# Patient Record
Sex: MALE | Race: BLACK OR AFRICAN AMERICAN | NOT HISPANIC OR LATINO | Employment: OTHER | URBAN - METROPOLITAN AREA
[De-identification: names, ages, dates, MRNs, and addresses within clinical notes are randomized per-mention and may not be internally consistent; named-entity substitution may affect disease eponyms.]

---

## 2021-01-01 ENCOUNTER — HOSPITAL ENCOUNTER (EMERGENCY)
Facility: HOSPITAL | Age: 66
Discharge: HOME | End: 2021-01-01
Attending: INTERNAL MEDICINE
Payer: MEDICARE

## 2021-01-01 VITALS
BODY MASS INDEX: 37.89 KG/M2 | DIASTOLIC BLOOD PRESSURE: 99 MMHG | HEART RATE: 103 BPM | RESPIRATION RATE: 18 BRPM | WEIGHT: 250 LBS | OXYGEN SATURATION: 97 % | SYSTOLIC BLOOD PRESSURE: 178 MMHG | TEMPERATURE: 100 F | HEIGHT: 68 IN

## 2021-01-01 DIAGNOSIS — U07.1 COVID-19: Primary | ICD-10-CM

## 2021-01-01 DIAGNOSIS — I50.22 CHRONIC SYSTOLIC CONGESTIVE HEART FAILURE: ICD-10-CM

## 2021-01-01 DIAGNOSIS — I10 ESSENTIAL HYPERTENSION: ICD-10-CM

## 2021-01-01 DIAGNOSIS — I25.10 ATHEROSCLEROSIS OF CORONARY ARTERY, ANGINA PRESENCE UNSPECIFIED, UNSPECIFIED VESSEL OR LESION TYPE, UNSPECIFIED WHETHER NATIVE OR TRANSPLANTED HEART: ICD-10-CM

## 2021-01-01 PROBLEM — I63.9 CEREBROVASCULAR ACCIDENT: Status: ACTIVE | Noted: 2018-01-13

## 2021-01-01 PROBLEM — E78.5 HYPERLIPIDEMIA: Status: ACTIVE | Noted: 2021-01-01

## 2021-01-01 LAB
CTP QC/QA: YES
SARS-COV-2 RDRP RESP QL NAA+PROBE: POSITIVE

## 2021-01-01 PROCEDURE — 99284 EMERGENCY DEPT VISIT MOD MDM: CPT | Mod: 25,ER

## 2021-01-01 PROCEDURE — 25000003 PHARM REV CODE 250: Mod: ER | Performed by: INTERNAL MEDICINE

## 2021-01-01 PROCEDURE — U0002 COVID-19 LAB TEST NON-CDC: HCPCS | Mod: ER | Performed by: INTERNAL MEDICINE

## 2021-01-01 RX ORDER — PRAVASTATIN SODIUM 40 MG/1
TABLET ORAL
COMMUNITY

## 2021-01-01 RX ORDER — IBUPROFEN 400 MG/1
800 TABLET ORAL
Status: COMPLETED | OUTPATIENT
Start: 2021-01-01 | End: 2021-01-01

## 2021-01-01 RX ORDER — SPIRONOLACTONE 25 MG/1
TABLET ORAL
COMMUNITY

## 2021-01-01 RX ORDER — HYDRALAZINE HYDROCHLORIDE 10 MG/1
TABLET, FILM COATED ORAL
COMMUNITY

## 2021-01-01 RX ORDER — CLONIDINE HYDROCHLORIDE 0.1 MG/1
TABLET ORAL
COMMUNITY

## 2021-01-01 RX ORDER — IBUPROFEN 800 MG/1
800 TABLET ORAL EVERY 8 HOURS PRN
Qty: 30 TABLET | Refills: 0 | Status: SHIPPED | OUTPATIENT
Start: 2021-01-01 | End: 2022-05-24

## 2021-01-01 RX ORDER — AMLODIPINE BESYLATE 10 MG/1
TABLET ORAL
COMMUNITY

## 2021-01-01 RX ORDER — LISINOPRIL 40 MG/1
TABLET ORAL
COMMUNITY

## 2021-01-01 RX ADMIN — IBUPROFEN 800 MG: 400 TABLET, FILM COATED ORAL at 06:01

## 2021-01-02 ENCOUNTER — TELEPHONE (OUTPATIENT)
Dept: ADMINISTRATIVE | Facility: CLINIC | Age: 66
End: 2021-01-02

## 2021-01-07 ENCOUNTER — HOSPITAL ENCOUNTER (EMERGENCY)
Facility: HOSPITAL | Age: 66
Discharge: HOME | End: 2021-01-07
Attending: EMERGENCY MEDICINE
Payer: MEDICARE

## 2021-01-07 ENCOUNTER — TELEPHONE (OUTPATIENT)
Dept: ADMINISTRATIVE | Facility: CLINIC | Age: 66
End: 2021-01-07

## 2021-01-07 VITALS
RESPIRATION RATE: 20 BRPM | DIASTOLIC BLOOD PRESSURE: 89 MMHG | WEIGHT: 230 LBS | HEART RATE: 89 BPM | BODY MASS INDEX: 34.86 KG/M2 | SYSTOLIC BLOOD PRESSURE: 141 MMHG | OXYGEN SATURATION: 98 % | HEIGHT: 68 IN | TEMPERATURE: 99 F

## 2021-01-07 DIAGNOSIS — U07.1 COVID-19 VIRUS INFECTION: Primary | ICD-10-CM

## 2021-01-07 PROCEDURE — 99284 EMERGENCY DEPT VISIT MOD MDM: CPT | Mod: ER

## 2021-01-07 RX ORDER — PROMETHAZINE HYDROCHLORIDE AND DEXTROMETHORPHAN HYDROBROMIDE 6.25; 15 MG/5ML; MG/5ML
5 SYRUP ORAL NIGHTLY PRN
Qty: 118 ML | Refills: 0 | Status: SHIPPED | OUTPATIENT
Start: 2021-01-07 | End: 2021-01-17

## 2021-01-07 RX ORDER — ALBUTEROL SULFATE 90 UG/1
2 AEROSOL, METERED RESPIRATORY (INHALATION) EVERY 6 HOURS PRN
Qty: 18 G | Refills: 0 | Status: SHIPPED | OUTPATIENT
Start: 2021-01-07 | End: 2022-05-24

## 2021-01-07 RX ORDER — PREDNISONE 20 MG/1
40 TABLET ORAL DAILY
Qty: 10 TABLET | Refills: 0 | Status: SHIPPED | OUTPATIENT
Start: 2021-01-07 | End: 2021-01-12

## 2021-01-07 RX ORDER — BENZONATATE 100 MG/1
100 CAPSULE ORAL 3 TIMES DAILY PRN
Qty: 20 CAPSULE | Refills: 0 | Status: SHIPPED | OUTPATIENT
Start: 2021-01-07 | End: 2021-01-17

## 2022-05-24 ENCOUNTER — HOSPITAL ENCOUNTER (OUTPATIENT)
Facility: HOSPITAL | Age: 67
Discharge: HOME | End: 2022-05-25
Attending: EMERGENCY MEDICINE | Admitting: INTERNAL MEDICINE
Payer: MEDICARE

## 2022-05-24 DIAGNOSIS — R42 DIZZINESS: ICD-10-CM

## 2022-05-24 DIAGNOSIS — I63.9 CEREBROVASCULAR ACCIDENT (CVA), UNSPECIFIED MECHANISM: Primary | ICD-10-CM

## 2022-05-24 DIAGNOSIS — I63.9 CVA (CEREBRAL VASCULAR ACCIDENT): ICD-10-CM

## 2022-05-24 PROBLEM — E66.9 OBESITY (BMI 30-39.9): Status: RESOLVED | Noted: 2022-05-24 | Resolved: 2022-05-24

## 2022-05-24 PROBLEM — G45.9 TIA (TRANSIENT ISCHEMIC ATTACK): Status: ACTIVE | Noted: 2022-05-24

## 2022-05-24 PROBLEM — E66.9 OBESITY (BMI 30.0-34.9): Status: ACTIVE | Noted: 2022-05-24

## 2022-05-24 PROBLEM — E66.01 MORBID OBESITY: Status: ACTIVE | Noted: 2022-05-24

## 2022-05-24 LAB
ALBUMIN SERPL-MCNC: 3.7 G/DL (ref 3.3–5.5)
ALP SERPL-CCNC: 84 U/L (ref 42–141)
BILIRUB SERPL-MCNC: 0.4 MG/DL (ref 0.2–1.6)
BUN SERPL-MCNC: 15 MG/DL (ref 7–22)
CALCIUM SERPL-MCNC: 9.7 MG/DL (ref 8–10.3)
CHLORIDE SERPL-SCNC: 110 MMOL/L (ref 98–108)
CREAT SERPL-MCNC: 1.2 MG/DL (ref 0.6–1.2)
CTP QC/QA: YES
GLUCOSE SERPL-MCNC: 132 MG/DL (ref 73–118)
POC ALT (SGPT): 48 U/L (ref 10–47)
POC AST (SGOT): 39 U/L (ref 11–38)
POC TCO2: 31 MMOL/L (ref 18–33)
POCT GLUCOSE: 137 MG/DL (ref 70–110)
POTASSIUM BLD-SCNC: 5.4 MMOL/L (ref 3.6–5.1)
PROTEIN, POC: 8 G/DL (ref 6.4–8.1)
SARS-COV-2 RDRP RESP QL NAA+PROBE: NEGATIVE
SODIUM BLD-SCNC: 144 MMOL/L (ref 128–145)

## 2022-05-24 PROCEDURE — 36415 COLL VENOUS BLD VENIPUNCTURE: CPT | Performed by: NURSE PRACTITIONER

## 2022-05-24 PROCEDURE — 25000003 PHARM REV CODE 250: Mod: ER | Performed by: EMERGENCY MEDICINE

## 2022-05-24 PROCEDURE — 83036 HEMOGLOBIN GLYCOSYLATED A1C: CPT | Performed by: NURSE PRACTITIONER

## 2022-05-24 PROCEDURE — 21400001 HC TELEMETRY ROOM

## 2022-05-24 PROCEDURE — G0378 HOSPITAL OBSERVATION PER HR: HCPCS

## 2022-05-24 PROCEDURE — 93010 EKG 12-LEAD: ICD-10-PCS | Mod: ,,, | Performed by: INTERNAL MEDICINE

## 2022-05-24 PROCEDURE — 99285 EMERGENCY DEPT VISIT HI MDM: CPT | Mod: 25,ER

## 2022-05-24 PROCEDURE — 82962 GLUCOSE BLOOD TEST: CPT | Mod: ER

## 2022-05-24 PROCEDURE — 80053 COMPREHEN METABOLIC PANEL: CPT | Mod: ER

## 2022-05-24 PROCEDURE — 93010 ELECTROCARDIOGRAM REPORT: CPT | Mod: ,,, | Performed by: INTERNAL MEDICINE

## 2022-05-24 PROCEDURE — 93005 ELECTROCARDIOGRAM TRACING: CPT | Mod: ER

## 2022-05-24 PROCEDURE — U0002 COVID-19 LAB TEST NON-CDC: HCPCS | Mod: ER | Performed by: EMERGENCY MEDICINE

## 2022-05-24 PROCEDURE — 85025 COMPLETE CBC W/AUTO DIFF WBC: CPT | Mod: ER

## 2022-05-24 PROCEDURE — 84443 ASSAY THYROID STIM HORMONE: CPT | Performed by: NURSE PRACTITIONER

## 2022-05-24 PROCEDURE — 80061 LIPID PANEL: CPT | Performed by: NURSE PRACTITIONER

## 2022-05-24 RX ORDER — CLONIDINE HYDROCHLORIDE 0.1 MG/1
0.1 TABLET ORAL 2 TIMES DAILY
Status: DISCONTINUED | OUTPATIENT
Start: 2022-05-25 | End: 2022-05-25 | Stop reason: HOSPADM

## 2022-05-24 RX ORDER — CLOPIDOGREL BISULFATE 75 MG/1
75 TABLET ORAL DAILY
Status: DISCONTINUED | OUTPATIENT
Start: 2022-05-25 | End: 2022-05-25 | Stop reason: HOSPADM

## 2022-05-24 RX ORDER — HYDRALAZINE HYDROCHLORIDE 10 MG/1
10 TABLET, FILM COATED ORAL EVERY 8 HOURS
Status: DISCONTINUED | OUTPATIENT
Start: 2022-05-25 | End: 2022-05-25 | Stop reason: HOSPADM

## 2022-05-24 RX ORDER — SODIUM CHLORIDE 0.9 % (FLUSH) 0.9 %
10 SYRINGE (ML) INJECTION
Status: DISCONTINUED | OUTPATIENT
Start: 2022-05-25 | End: 2022-05-25 | Stop reason: HOSPADM

## 2022-05-24 RX ORDER — SPIRONOLACTONE 25 MG/1
25 TABLET ORAL DAILY
Status: DISCONTINUED | OUTPATIENT
Start: 2022-05-25 | End: 2022-05-25 | Stop reason: HOSPADM

## 2022-05-24 RX ORDER — ONDANSETRON 2 MG/ML
4 INJECTION INTRAMUSCULAR; INTRAVENOUS EVERY 6 HOURS PRN
Status: DISCONTINUED | OUTPATIENT
Start: 2022-05-25 | End: 2022-05-25 | Stop reason: HOSPADM

## 2022-05-24 RX ORDER — CLOPIDOGREL BISULFATE 75 MG/1
75 TABLET ORAL
Status: COMPLETED | OUTPATIENT
Start: 2022-05-24 | End: 2022-05-24

## 2022-05-24 RX ORDER — PRAVASTATIN SODIUM 40 MG/1
40 TABLET ORAL NIGHTLY
Status: DISCONTINUED | OUTPATIENT
Start: 2022-05-25 | End: 2022-05-25 | Stop reason: HOSPADM

## 2022-05-24 RX ORDER — NAPROXEN SODIUM 220 MG/1
324 TABLET, FILM COATED ORAL
Status: COMPLETED | OUTPATIENT
Start: 2022-05-24 | End: 2022-05-24

## 2022-05-24 RX ORDER — AMLODIPINE BESYLATE 5 MG/1
10 TABLET ORAL DAILY
Status: DISCONTINUED | OUTPATIENT
Start: 2022-05-25 | End: 2022-05-25 | Stop reason: HOSPADM

## 2022-05-24 RX ORDER — ASPIRIN 81 MG/1
81 TABLET ORAL DAILY
Status: DISCONTINUED | OUTPATIENT
Start: 2022-05-25 | End: 2022-05-25 | Stop reason: HOSPADM

## 2022-05-24 RX ORDER — ATORVASTATIN CALCIUM 40 MG/1
40 TABLET, FILM COATED ORAL DAILY
Status: DISCONTINUED | OUTPATIENT
Start: 2022-05-25 | End: 2022-05-24

## 2022-05-24 RX ORDER — LISINOPRIL 20 MG/1
40 TABLET ORAL DAILY
Status: DISCONTINUED | OUTPATIENT
Start: 2022-05-25 | End: 2022-05-25 | Stop reason: HOSPADM

## 2022-05-24 RX ORDER — ENOXAPARIN SODIUM 100 MG/ML
40 INJECTION SUBCUTANEOUS EVERY 24 HOURS
Status: DISCONTINUED | OUTPATIENT
Start: 2022-05-25 | End: 2022-05-25 | Stop reason: HOSPADM

## 2022-05-24 RX ORDER — LABETALOL HYDROCHLORIDE 5 MG/ML
10 INJECTION, SOLUTION INTRAVENOUS EVERY 6 HOURS PRN
Status: DISCONTINUED | OUTPATIENT
Start: 2022-05-25 | End: 2022-05-25 | Stop reason: HOSPADM

## 2022-05-24 RX ADMIN — CLOPIDOGREL 75 MG: 75 TABLET, FILM COATED ORAL at 06:05

## 2022-05-24 RX ADMIN — ASPIRIN 81 MG CHEWABLE TABLET 324 MG: 81 TABLET CHEWABLE at 06:05

## 2022-05-24 NOTE — ED PROVIDER NOTES
Encounter Date: 5/24/2022    SCRIBE #1 NOTE: I, Dorothea Dior, am scribing for, and in the presence of,  Ernie Fitzgerald MD. I have scribed the following portions of the note - Other sections scribed: HPI; ROS.       History     Chief Complaint   Patient presents with    Dizziness    Headache     Co dizziness and ha; onset this morning; denies cp or shob.     Huang Knight is a 67 y.o. male with Hx of HTN who presents to the ED for chief complaint of dizziness and headache onset 4 AM today. Patient reports that he experiences the dizziness when going from a lying to a sitting position. He states that if he stands up and ambulates, the dizziness resolves. Patient reports that his headache is better now compared to this morning. He attempted treatment with Aspirin and Tylenol with some relief of the headache. Patient endorses EtOH use occasionally but denies tobacco use. He denies appetite change, visual disturbance, coughing, congestion, fever, chills, nausea, vomiting, diarrhea, chest pain, or shortness of breath. No further complaints at this present time.     The history is provided by the patient. No  was used.     Review of patient's allergies indicates:   Allergen Reactions    Penicillins      Past Medical History:   Diagnosis Date    Hypertension      History reviewed. No pertinent surgical history.  History reviewed. No pertinent family history.  Social History     Tobacco Use    Smoking status: Never Smoker    Smokeless tobacco: Never Used   Substance Use Topics    Alcohol use: Yes     Comment: RARELY    Drug use: Never     Review of Systems   Constitutional: Negative.  Negative for appetite change, chills and fever.   HENT: Negative.  Negative for congestion.    Eyes: Negative.  Negative for visual disturbance.   Respiratory: Negative.  Negative for cough and shortness of breath.    Cardiovascular: Negative.  Negative for chest pain.   Gastrointestinal: Negative.  Negative  for diarrhea, nausea and vomiting.   Endocrine: Negative.    Genitourinary: Negative.    Musculoskeletal: Negative.    Skin: Negative.    Allergic/Immunologic: Negative.    Neurological: Positive for dizziness and headaches.   Hematological: Negative.    Psychiatric/Behavioral: Negative.        Physical Exam     Initial Vitals [05/24/22 1724]   BP Pulse Resp Temp SpO2   (!) 163/89 77 17 98 °F (36.7 °C) 96 %      MAP       --         Physical Exam    Nursing note and vitals reviewed.  Constitutional: He appears well-developed and well-nourished. He is not diaphoretic. No distress.   HENT:   Head: Normocephalic and atraumatic.   Nose: Nose normal.   Mouth/Throat: No oropharyngeal exudate.   Eyes: EOM are normal. Pupils are equal, round, and reactive to light.   Neck: Neck supple. No tracheal deviation present. No JVD present.   Normal range of motion.  Cardiovascular: Normal rate, regular rhythm and normal heart sounds.   No murmur heard.  Pulmonary/Chest: Breath sounds normal. No respiratory distress. He has no wheezes. He has no rhonchi. He has no rales.   Abdominal: Abdomen is soft. Bowel sounds are normal. He exhibits no distension. There is no abdominal tenderness. There is no rebound and no guarding.   Musculoskeletal:         General: No tenderness or edema. Normal range of motion.      Cervical back: Normal range of motion and neck supple.     Neurological: He is alert and oriented to person, place, and time. He has normal strength. No cranial nerve deficit.   Skin: Skin is warm and dry. Capillary refill takes less than 2 seconds. No rash noted. No erythema.         ED Course   Procedures  Labs Reviewed   POCT GLUCOSE - Abnormal; Notable for the following components:       Result Value    POCT Glucose 137 (*)     All other components within normal limits   POCT CMP - Abnormal; Notable for the following components:    ALT (SGPT), POC 48 (*)     AST (SGOT), POC 39 (*)     POC Chloride 110 (*)     POC Glucose 132  (*)     POC Potassium 5.4 (*)     All other components within normal limits   POCT CBC   POCT GLUCOSE, HAND-HELD DEVICE   SARS-COV-2 RDRP GENE   POCT CMP                Imaging Results           CT Head Without Contrast (Final result)  Result time 05/24/22 18:01:49    Final result by Bonnie Medellin MD (05/24/22 18:01:49)                 Impression:      Age-indeterminate left basal ganglia infarct.    This report was flagged in Epic as abnormal.      Electronically signed by: Bonnie Medellin  Date:    05/24/2022  Time:    18:01             Narrative:    EXAMINATION:  CT OF THE HEAD WITHOUT    CLINICAL HISTORY:  Headache, new or worsening (Age >= 50y);    TECHNIQUE:  5 mm unenhanced axial images were obtained from the skull base to the vertex.    COMPARISON:  None.    FINDINGS:  Mild cerebral atrophy and chronic small vessel ischemic changes are present.  There is bifrontal atrophy versus bilateral subdural hygromas.  There is no acute intracranial hemorrhage, territorial infarct or mass effect, or midline shift.  There is an age-indeterminate left basal ganglia infarct.  In the visualized paranasal sinuses, mucoperiosteal thickening is seen involving the right ethmoid air cells.                                 Medications   aspirin chewable tablet 324 mg (324 mg Oral Given 5/24/22 1842)   clopidogreL tablet 75 mg (75 mg Oral Given 5/24/22 1842)     Medical Decision Making:   History:   Old Medical Records: I decided to obtain old medical records.  Independently Interpreted Test(s):   I have ordered and independently interpreted EKG Reading(s) - see prior notes  Clinical Tests:   Lab Tests: Ordered and Reviewed  Radiological Study: Ordered and Reviewed  Medical Tests: Ordered and Reviewed    MDM:    67-year-old male with past medical history as noted above presenting with headache and dizziness, last known normal was last night.  Patient is out of window for tPA, concerning presentation for acute CVA.  CT head  showing age-indeterminate left basal gangliar infarct.  Permissive hypertension allowed, after further review of patient's medical record noted to have a chronic hemorrhagic infarct as noted on prior MRI from last year, discussed further with Neurology, Dr. Young, who requests full aspirin and only 75 mg of Plavix, avoid Plavix load at this point.  Patient will need continued imaging and further workup.  Admitted to Hospital Medicine in stable condition.  Discussed diagnosis and further treatment with patient and family at bedside. All questions answered, patient transferred to floor improved and stable.            Scribe Attestation:   Scribe #1: I performed the above scribed service and the documentation accurately describes the services I performed. I attest to the accuracy of the note.               I, Ernie Fitzgerald M.D., personally performed the services described in this documentation.  All medical record entries made by the scribe were at my direction and in my presence.  I have reviewed the chart and agree that the record reflects my personal performance and is accurate and complete.  Clinical Impression:   Final diagnoses:  [R42] Dizziness  [I63.9] Cerebrovascular accident (CVA), unspecified mechanism (Primary)          ED Disposition Condition    Admit               Ernie Fitzgerald MD  05/24/22 0886

## 2022-05-24 NOTE — FIRST PROVIDER EVALUATION
Emergency Department TeleTriage Encounter Note      CHIEF COMPLAINT    Chief Complaint   Patient presents with    Dizziness    Headache     Co dizziness and ha; onset this morning; denies cp or shob.       VITAL SIGNS   Initial Vitals [05/24/22 1724]   BP Pulse Resp Temp SpO2   (!) 163/89 77 17 98 °F (36.7 °C) 96 %      MAP       --            ALLERGIES    Review of patient's allergies indicates:   Allergen Reactions    Penicillins        PROVIDER TRIAGE NOTE  Patient presents to the ER with complaint of headaches and dizziness since 5 am this morning.  He reports dizziness worse with standing. He denies nausea or vomiting.  He denies weakness or numbness in arms or legs, vision changes.     Will order EKG,  labs, head CT pending ED provider evaluation.      Initial orders will be placed and care will be transferred to an alternate provider when patient is roomed for a full evaluation. Any additional orders and the final disposition will be determined by that provider.         ORDERS  Labs Reviewed   POCT GLUCOSE, HAND-HELD DEVICE       ED Orders (720h ago, onward)    Start Ordered     Status Ordering Provider    05/24/22 1730 05/24/22 1729  POCT Glucose, Hand-Held Device  Once         Ordered RIKKI IVY A            Virtual Visit Note: The provider triage portion of this emergency department evaluation and documentation was performed via [employer], a HIPAA-compliant telemedicine application, in concert with a tele-presenter in the room. A face to face patient evaluation with one of my colleagues will occur once the patient is placed in an emergency department room.      DISCLAIMER: This note was prepared with [employer] voice recognition transcription software. Garbled syntax, mangled pronouns, and other bizarre constructions may be attributed to that software system.

## 2022-05-25 ENCOUNTER — TELEPHONE (OUTPATIENT)
Dept: NEUROSURGERY | Facility: CLINIC | Age: 67
End: 2022-05-25
Payer: MEDICAID

## 2022-05-25 VITALS
DIASTOLIC BLOOD PRESSURE: 77 MMHG | WEIGHT: 210 LBS | RESPIRATION RATE: 18 BRPM | HEART RATE: 84 BPM | HEIGHT: 68 IN | OXYGEN SATURATION: 96 % | BODY MASS INDEX: 31.83 KG/M2 | SYSTOLIC BLOOD PRESSURE: 125 MMHG | TEMPERATURE: 99 F

## 2022-05-25 LAB
ALBUMIN SERPL BCP-MCNC: 3.8 G/DL (ref 3.5–5.2)
ALP SERPL-CCNC: 85 U/L (ref 55–135)
ALT SERPL W/O P-5'-P-CCNC: 51 U/L (ref 10–44)
ANION GAP SERPL CALC-SCNC: 5 MMOL/L (ref 8–16)
AORTIC VALVE CUSP SEPERATION: 2.87 CM
APTT BLDCRRT: 29.9 SEC (ref 21–32)
ASCENDING AORTA: 3.27 CM
AST SERPL-CCNC: 21 U/L (ref 10–40)
AV INDEX (PROSTH): 0.63
AV MEAN GRADIENT: 5 MMHG
AV PEAK GRADIENT: 11 MMHG
AV VALVE AREA: 2.54 CM2
AV VELOCITY RATIO: 0.61
BASOPHILS # BLD AUTO: 0.04 K/UL (ref 0–0.2)
BASOPHILS NFR BLD: 0.7 % (ref 0–1.9)
BILIRUB SERPL-MCNC: 0.4 MG/DL (ref 0.1–1)
BILIRUB UR QL STRIP: NEGATIVE
BSA FOR ECHO PROCEDURE: 2.14 M2
BUN SERPL-MCNC: 14 MG/DL (ref 8–23)
CALCIUM SERPL-MCNC: 9.3 MG/DL (ref 8.7–10.5)
CHLORIDE SERPL-SCNC: 107 MMOL/L (ref 95–110)
CHOLEST SERPL-MCNC: 202 MG/DL (ref 120–199)
CHOLEST/HDLC SERPL: 4.3 {RATIO} (ref 2–5)
CK MB SERPL-MCNC: 1.4 NG/ML (ref 0.1–6.5)
CK MB SERPL-RTO: 1.3 % (ref 0–5)
CK SERPL-CCNC: 109 U/L (ref 20–200)
CLARITY UR: CLEAR
CO2 SERPL-SCNC: 27 MMOL/L (ref 23–29)
COLOR UR: YELLOW
CREAT SERPL-MCNC: 1 MG/DL (ref 0.5–1.4)
CV ECHO LV RWT: 0.39 CM
DIFFERENTIAL METHOD: ABNORMAL
DOP CALC AO PEAK VEL: 1.66 M/S
DOP CALC AO VTI: 33.03 CM
DOP CALC LVOT AREA: 4 CM2
DOP CALC LVOT DIAMETER: 2.27 CM
DOP CALC LVOT PEAK VEL: 1.02 M/S
DOP CALC LVOT STROKE VOLUME: 83.97 CM3
DOP CALCLVOT PEAK VEL VTI: 20.76 CM
E WAVE DECELERATION TIME: 287.85 MSEC
E/A RATIO: 0.64
E/E' RATIO: 12.55 M/S
ECHO LV POSTERIOR WALL: 1.08 CM (ref 0.6–1.1)
EJECTION FRACTION: 55 %
EOSINOPHIL # BLD AUTO: 0.1 K/UL (ref 0–0.5)
EOSINOPHIL NFR BLD: 2 % (ref 0–8)
ERYTHROCYTE [DISTWIDTH] IN BLOOD BY AUTOMATED COUNT: 13.9 % (ref 11.5–14.5)
EST. GFR  (AFRICAN AMERICAN): >60 ML/MIN/1.73 M^2
EST. GFR  (NON AFRICAN AMERICAN): >60 ML/MIN/1.73 M^2
ESTIMATED AVG GLUCOSE: 137 MG/DL (ref 68–131)
FRACTIONAL SHORTENING: 27 % (ref 28–44)
GLUCOSE SERPL-MCNC: 122 MG/DL (ref 70–110)
GLUCOSE UR QL STRIP: NEGATIVE
HBA1C MFR BLD: 6.4 % (ref 4–5.6)
HCT VFR BLD AUTO: 46.1 % (ref 40–54)
HDLC SERPL-MCNC: 47 MG/DL (ref 40–75)
HDLC SERPL: 23.3 % (ref 20–50)
HGB BLD-MCNC: 13.9 G/DL (ref 14–18)
HGB UR QL STRIP: NEGATIVE
IMM GRANULOCYTES # BLD AUTO: 0.01 K/UL (ref 0–0.04)
IMM GRANULOCYTES NFR BLD AUTO: 0.2 % (ref 0–0.5)
INR PPP: 1 (ref 0.8–1.2)
INTERVENTRICULAR SEPTUM: 1.1 CM (ref 0.6–1.1)
IVRT: 91.34 MSEC
KETONES UR QL STRIP: NEGATIVE
LA MAJOR: 5.75 CM
LA MINOR: 5.19 CM
LA WIDTH: 3.49 CM
LDLC SERPL CALC-MCNC: 126 MG/DL (ref 63–159)
LEFT ATRIUM SIZE: 3.97 CM
LEFT ATRIUM VOLUME INDEX: 30.7 ML/M2
LEFT ATRIUM VOLUME: 64.25 CM3
LEFT INTERNAL DIMENSION IN SYSTOLE: 4.05 CM (ref 2.1–4)
LEFT VENTRICLE DIASTOLIC VOLUME INDEX: 71.56 ML/M2
LEFT VENTRICLE DIASTOLIC VOLUME: 149.55 ML
LEFT VENTRICLE MASS INDEX: 115 G/M2
LEFT VENTRICLE SYSTOLIC VOLUME INDEX: 34.6 ML/M2
LEFT VENTRICLE SYSTOLIC VOLUME: 72.28 ML
LEFT VENTRICULAR INTERNAL DIMENSION IN DIASTOLE: 5.53 CM (ref 3.5–6)
LEFT VENTRICULAR MASS: 241.22 G
LEUKOCYTE ESTERASE UR QL STRIP: NEGATIVE
LV LATERAL E/E' RATIO: 9.86 M/S
LV SEPTAL E/E' RATIO: 17.25 M/S
LYMPHOCYTES # BLD AUTO: 2.1 K/UL (ref 1–4.8)
LYMPHOCYTES NFR BLD: 37.4 % (ref 18–48)
MAGNESIUM SERPL-MCNC: 2.2 MG/DL (ref 1.6–2.6)
MCH RBC QN AUTO: 25.9 PG (ref 27–31)
MCHC RBC AUTO-ENTMCNC: 30.2 G/DL (ref 32–36)
MCV RBC AUTO: 86 FL (ref 82–98)
MONOCYTES # BLD AUTO: 0.5 K/UL (ref 0.3–1)
MONOCYTES NFR BLD: 8.3 % (ref 4–15)
MV PEAK A VEL: 1.08 M/S
MV PEAK E VEL: 0.69 M/S
NEUTROPHILS # BLD AUTO: 2.9 K/UL (ref 1.8–7.7)
NEUTROPHILS NFR BLD: 51.4 % (ref 38–73)
NITRITE UR QL STRIP: NEGATIVE
NONHDLC SERPL-MCNC: 155 MG/DL
NRBC BLD-RTO: 0 /100 WBC
PH UR STRIP: 6 [PH] (ref 5–8)
PHOSPHATE SERPL-MCNC: 2.9 MG/DL (ref 2.7–4.5)
PISA TR MAX VEL: 2.13 M/S
PLATELET # BLD AUTO: 204 K/UL (ref 150–450)
PMV BLD AUTO: 9.5 FL (ref 9.2–12.9)
POTASSIUM SERPL-SCNC: 4.2 MMOL/L (ref 3.5–5.1)
PROT SERPL-MCNC: 7.5 G/DL (ref 6–8.4)
PROT UR QL STRIP: NEGATIVE
PROTHROMBIN TIME: 10.3 SEC (ref 9–12.5)
PULM VEIN S/D RATIO: 1.4
PV PEAK D VEL: 0.3 M/S
PV PEAK S VEL: 0.42 M/S
PV PEAK VELOCITY: 0.77 CM/S
RA MAJOR: 4.31 CM
RA PRESSURE: 3 MMHG
RA WIDTH: 3.54 CM
RBC # BLD AUTO: 5.37 M/UL (ref 4.6–6.2)
RIGHT VENTRICULAR END-DIASTOLIC DIMENSION: 2.9 CM
SINUS: 3.6 CM
SODIUM SERPL-SCNC: 139 MMOL/L (ref 136–145)
SP GR UR STRIP: 1.02 (ref 1–1.03)
STJ: 3.15 CM
TDI LATERAL: 0.07 M/S
TDI SEPTAL: 0.04 M/S
TDI: 0.06 M/S
TR MAX PG: 18 MMHG
TRICUSPID ANNULAR PLANE SYSTOLIC EXCURSION: 2.38 CM
TRIGL SERPL-MCNC: 145 MG/DL (ref 30–150)
TROPONIN I SERPL DL<=0.01 NG/ML-MCNC: 0.02 NG/ML (ref 0–0.03)
TSH SERPL DL<=0.005 MIU/L-ACNC: 1.78 UIU/ML (ref 0.4–4)
TV REST PULMONARY ARTERY PRESSURE: 21 MMHG
URN SPEC COLLECT METH UR: NORMAL
UROBILINOGEN UR STRIP-ACNC: NEGATIVE EU/DL
WBC # BLD AUTO: 5.54 K/UL (ref 3.9–12.7)

## 2022-05-25 PROCEDURE — 99203 OFFICE O/P NEW LOW 30 MIN: CPT | Mod: ,,, | Performed by: PSYCHIATRY & NEUROLOGY

## 2022-05-25 PROCEDURE — G0378 HOSPITAL OBSERVATION PER HR: HCPCS

## 2022-05-25 PROCEDURE — 84484 ASSAY OF TROPONIN QUANT: CPT | Performed by: NURSE PRACTITIONER

## 2022-05-25 PROCEDURE — 85025 COMPLETE CBC W/AUTO DIFF WBC: CPT | Performed by: NURSE PRACTITIONER

## 2022-05-25 PROCEDURE — 85730 THROMBOPLASTIN TIME PARTIAL: CPT | Performed by: NURSE PRACTITIONER

## 2022-05-25 PROCEDURE — 84100 ASSAY OF PHOSPHORUS: CPT | Performed by: NURSE PRACTITIONER

## 2022-05-25 PROCEDURE — 80053 COMPREHEN METABOLIC PANEL: CPT | Performed by: NURSE PRACTITIONER

## 2022-05-25 PROCEDURE — 99203 PR OFFICE/OUTPT VISIT, NEW, LEVL III, 30-44 MIN: ICD-10-PCS | Mod: ,,, | Performed by: PSYCHIATRY & NEUROLOGY

## 2022-05-25 PROCEDURE — 97161 PT EVAL LOW COMPLEX 20 MIN: CPT

## 2022-05-25 PROCEDURE — 81003 URINALYSIS AUTO W/O SCOPE: CPT | Performed by: NURSE PRACTITIONER

## 2022-05-25 PROCEDURE — 36415 COLL VENOUS BLD VENIPUNCTURE: CPT | Performed by: NURSE PRACTITIONER

## 2022-05-25 PROCEDURE — 85610 PROTHROMBIN TIME: CPT | Performed by: NURSE PRACTITIONER

## 2022-05-25 PROCEDURE — 83735 ASSAY OF MAGNESIUM: CPT | Performed by: NURSE PRACTITIONER

## 2022-05-25 PROCEDURE — 82553 CREATINE MB FRACTION: CPT | Performed by: NURSE PRACTITIONER

## 2022-05-25 PROCEDURE — 97165 OT EVAL LOW COMPLEX 30 MIN: CPT

## 2022-05-25 PROCEDURE — 25000003 PHARM REV CODE 250: Performed by: NURSE PRACTITIONER

## 2022-05-25 RX ADMIN — LISINOPRIL 40 MG: 20 TABLET ORAL at 08:05

## 2022-05-25 RX ADMIN — ASPIRIN 81 MG: 81 TABLET, COATED ORAL at 11:05

## 2022-05-25 RX ADMIN — SPIRONOLACTONE 25 MG: 25 TABLET, FILM COATED ORAL at 08:05

## 2022-05-25 RX ADMIN — HYDRALAZINE HYDROCHLORIDE 10 MG: 10 TABLET, FILM COATED ORAL at 01:05

## 2022-05-25 RX ADMIN — CLONIDINE HYDROCHLORIDE 0.1 MG: 0.1 TABLET ORAL at 08:05

## 2022-05-25 RX ADMIN — HYDRALAZINE HYDROCHLORIDE 10 MG: 10 TABLET, FILM COATED ORAL at 04:05

## 2022-05-25 RX ADMIN — CLOPIDOGREL 75 MG: 75 TABLET, FILM COATED ORAL at 11:05

## 2022-05-25 RX ADMIN — AMLODIPINE BESYLATE 10 MG: 5 TABLET ORAL at 08:05

## 2022-05-25 NOTE — NURSING
Patient awake, alert, and oriented x 4, sitting up in chair, no complaints or concerns voiced from pt at this time. Pt and pt's wife received verbal and written discharge instructions. They both verbalize understanding of all information received.

## 2022-05-25 NOTE — H&P
[facility] Medicine  History & Physical    Patient Name: Huang Knight  MRN: [id number]  Patient Class: IP- Inpatient  Admission Date: 5/24/2022  Attending Physician: Chandler Walton MD   Primary Care Provider: Primary Doctor No         Patient information was obtained from patient, spouse/SO, past medical records and ER records.     Subjective:     Principal Problem:Cerebrovascular accident    Chief Complaint:   Chief Complaint   Patient presents with    Dizziness    Headache     Co dizziness and ha; onset this morning; denies cp or shob.        HPI: 67 y.o. male  who presents to the ED for chief complaint of dizziness and headache onset 4 AM today. Patient reports that he experiences the dizziness when going from a lying to a sitting position. He states that if he stands up and ambulates, the dizziness resolves. Patient reports that his headache is better now compared to this morning. He attempted treatment with Aspirin and Tylenol with some relief of the headache. Patient endorses EtOH use occasionally but denies tobacco use. PMHx of HTN      Past Medical History:   Diagnosis Date    Hypertension        History reviewed. No pertinent surgical history.    Review of patient's allergies indicates:   Allergen Reactions    Penicillins        No current facility-administered medications on file prior to encounter.     Current Outpatient Medications on File Prior to Encounter   Medication Sig    amLODIPine (NORVASC) 10 MG tablet amlodipine 10 mg tablet   Take 1 tablet every day by oral route.    cloNIDine (CATAPRES) 0.1 MG tablet clonidine HCl 0.1 mg tablet    hydrALAZINE (APRESOLINE) 10 MG tablet hydralazine 10 mg tablet    lisinopriL (PRINIVIL,ZESTRIL) 40 MG tablet lisinopril 40 mg tablet   Take 1 tablet every day by oral route.    pravastatin (PRAVACHOL) 40 MG tablet pravastatin 40 mg tablet    spironolactone (ALDACTONE) 25 MG tablet spironolactone 25 mg tablet    [DISCONTINUED]  albuterol (PROVENTIL/VENTOLIN HFA) 90 mcg/actuation inhaler Inhale 2 puffs into the lungs every 6 (six) hours as needed for Wheezing or Shortness of Breath.    [DISCONTINUED] ibuprofen (ADVIL,MOTRIN) 800 MG tablet Take 1 tablet (800 mg total) by mouth every 8 (eight) hours as needed for Pain.    [DISCONTINUED] pulse oximeter (PULSE OXIMETER) device by Apply Externally route 2 (two) times a day. Use twice daily at 8 AM and 3 PM and record the value in MyChart as directed.     Family History    None       Tobacco Use    Smoking status: Never Smoker    Smokeless tobacco: Never Used   Substance and Sexual Activity    Alcohol use: Yes     Comment: RARELY    Drug use: Never    Sexual activity: Not on file     Review of Systems  Constitutional: Negative.  Negative for appetite change, chills and fever.   HENT: Negative.  Negative for congestion.    Eyes: Negative.  Negative for visual disturbance.   Respiratory: Negative.  Negative for cough and shortness of breath.    Cardiovascular: Negative.  Negative for chest pain.   Gastrointestinal: Negative.  Negative for diarrhea, nausea and vomiting.   Endocrine: Negative.    Genitourinary: Negative.    Musculoskeletal: Negative.    Skin: Negative.    Allergic/Immunologic: Negative.    Neurological: Positive for dizziness and headaches.   Hematological: Negative.    Psychiatric/Behavioral: Negative.       Objective:     Vital Signs (Most Recent):  Temp: 98.2 °F (36.8 °C) (05/24/22 2222)  Pulse: 67 (05/24/22 2222)  Resp: 20 (05/24/22 2222)  BP: (!) 180/91 (05/24/22 2222)  SpO2: 98 % (05/24/22 2202)   Vital Signs (24h Range):  Temp:  [98 °F (36.7 °C)-98.2 °F (36.8 °C)] 98.2 °F (36.8 °C)  Pulse:  [58-77] 67  Resp:  [14-21] 20  SpO2:  [96 %-99 %] 98 %  BP: (163-188)/(88-93) 180/91     Weight: 95.3 kg (210 lb)  Body mass index is 31.93 kg/m².    Physical Exam   Nursing note and vitals reviewed.  Constitutional: He appears well-developed and well-nourished. He is not  diaphoretic. No distress.   HENT:   Head: Normocephalic and atraumatic.   Nose: Nose normal.   Mouth/Throat: No oropharyngeal exudate.   Eyes: EOM are normal. Pupils are equal, round, and reactive to light.   Neck: Neck supple. No tracheal deviation present. No JVD present.   Normal range of motion.  Cardiovascular: Normal rate, regular rhythm and normal heart sounds.   No murmur heard.  Pulmonary/Chest: Breath sounds normal. No respiratory distress. He has no wheezes. He has no rhonchi. He has no rales.   Abdominal: Abdomen is soft. Bowel sounds are normal. He exhibits no distension. There is no abdominal tenderness. There is no rebound and no guarding.   Musculoskeletal:         General: No tenderness or edema. Normal range of motion.      Cervical back: Normal range of motion and neck supple.     Neurological: He is alert and oriented to person, place, and time. He has normal strength. No cranial nerve deficit.   Skin: Skin is warm and dry. Capillary refill takes less than 2 seconds. No rash noted. No erythema.      Significant Labs: All pertinent labs within the past 24 hours have been reviewed.    Significant Imaging: I have reviewed all pertinent imaging results/findings within the past 24 hours.    Assessment/Plan:     * Cerebrovascular accident  Consult neuro  Echo and MRI brain in am  PT/OT/ST eval in am  ASA, plavix, statin   Neuro checks  continuous tele monitoring  EKG as needed   CT head showing age-indeterminate left basal gangliar infarct      TIA (transient ischemic attack)    History of TIA last year     Morbid obesity  Body mass index is 31.93 kg/m². Morbid obesity complicates all aspects of disease management from diagnostic modalities to treatment. Weight loss encouraged and health benefits explained to patient.         Coronary atherosclerosis  Resume home meds       Essential hypertension    Resume home meds     Hyperlipidemia  Resume home statin         VTE Risk Mitigation (From admission,  onward)         Ordered     enoxaparin injection 40 mg  Daily         05/24/22 2320     IP VTE HIGH RISK PATIENT  Once         05/24/22 2320     Place sequential compression device  Until discontinued         05/24/22 2320                   Jose Miguel Hogan NP  Department of Hospital Medicine   [facility] - Telemetry

## 2022-05-25 NOTE — DISCHARGE SUMMARY
[facility] Medicine  Discharge Summary      Patient Name: Huang Knight  MRN: [id number]  Patient Class: IP- Inpatient  Admission Date: 5/24/2022  Hospital Length of Stay: 1 days  Discharge Date and Time:  05/25/2022 1:56 PM  Attending Physician: Chandler Walton MD   Discharging Provider: Chandler Walton MD  Primary Care Provider: Primary Doctor No      HPI:   67 y.o. male  who presents to the ED for chief complaint of dizziness and headache onset 4 AM today. Patient reports that he experiences the dizziness when going from a lying to a sitting position. He states that if he stands up and ambulates, the dizziness resolves. Patient reports that his headache is better now compared to this morning. He attempted treatment with Aspirin and Tylenol with some relief of the headache. Patient endorses EtOH use occasionally but denies tobacco use. PMHx of HTN      * No surgery found *      Hospital Course:   Patient admitted to the hospital for possible CVA.  Patient evaluated by neuro. MRI was negative and the patient will be discharged to home. Follow up with Dr. Boothe in one week. Activity as tolerated. Diet- low NA. Activity as tolerated. Patient's symptoms resolved.  ASA was stopped due to some micro- hemorrhages.        Goals of Care Treatment Preferences:  Code Status: Full Code      Consults:   Consults (From admission, onward)        Status Ordering Provider     IP consult to case management/social work  Once        Provider:  (Not yet assigned)    Acknowledged JUNAID FRAGA     Inpatient consult to neurology  Once        Provider:  (Not yet assigned)    Acknowledged ASHLEY ALARCON          No new Assessment & Plan notes have been filed under this hospital service since the last note was generated.  Service: Hospital Medicine    Final Active Diagnoses:    Diagnosis Date Noted POA    PRINCIPAL PROBLEM:  Cerebrovascular accident [I63.9] 01/13/2018 Yes    Morbid obesity  [E66.01] 05/24/2022 Yes    TIA (transient ischemic attack) [G45.9] 05/24/2022 Yes    Hyperlipidemia [E78.5] 01/01/2021 Yes    Essential hypertension [I10] 01/01/2021 Yes    Coronary atherosclerosis [I25.10] 03/02/2015 Yes      Problems Resolved During this Admission:    Diagnosis Date Noted Date Resolved POA    Obesity (BMI 30-39.9) [E66.9] 05/24/2022 05/24/2022 Unknown       Discharged Condition: good    Disposition: Home or Self Care    Follow Up:   Follow-up Information     Jett Boothe MD Follow up.    Specialty: Neurology  Contact information:  [address]  [address]  [facility] [address] [phone]                       Patient Instructions:   No discharge procedures on file.    Significant Diagnostic Studies:    Pending Diagnostic Studies:     Procedure Component Value Units Date/Time    EKG, 12 - Lead [[id number]]     Order Status: Sent Lab Status: No result          Medications:  Reconciled Home Medications:      Medication List      CONTINUE taking these medications    amLODIPine 10 MG tablet  Commonly known as: NORVASC  amlodipine 10 mg tablet   Take 1 tablet every day by oral route.     cloNIDine 0.1 MG tablet  Commonly known as: CATAPRES  clonidine HCl 0.1 mg tablet     hydrALAZINE 10 MG tablet  Commonly known as: APRESOLINE  hydralazine 10 mg tablet     lisinopriL 40 MG tablet  Commonly known as: PRINIVIL,ZESTRIL  lisinopril 40 mg tablet   Take 1 tablet every day by oral route.     pravastatin 40 MG tablet  Commonly known as: PRAVACHOL  pravastatin 40 mg tablet     spironolactone 25 MG tablet  Commonly known as: ALDACTONE  spironolactone 25 mg tablet            Indwelling Lines/Drains at time of discharge:   Lines/Drains/Airways     None                 Time spent on the discharge of patient: >35  minutes         Chandler Mendoza MD  Department of Hospital Medicine  [facility]   VIEW ALL

## 2022-05-25 NOTE — SUBJECTIVE & OBJECTIVE
Interval History: No new issues     Review of Systems   Constitutional:  Negative for activity change, appetite change, chills, diaphoresis and fatigue.   HENT:  Negative for congestion and dental problem.    Eyes:  Negative for discharge.   Respiratory:  Negative for apnea and chest tightness.    Cardiovascular:  Negative for chest pain.   Gastrointestinal:  Negative for abdominal distention, abdominal pain, nausea and vomiting.   Endocrine: Negative for cold intolerance.   Genitourinary:  Negative for difficulty urinating and dysuria.   Musculoskeletal:  Negative for arthralgias and back pain.   Skin:  Negative for color change.   Neurological:  Negative for facial asymmetry and headaches.   Psychiatric/Behavioral:  Negative for agitation and behavioral problems.    Objective:     Vital Signs (Most Recent):  Temp: 97.6 °F (36.4 °C) (05/25/22 0729)  Pulse: 61 (05/25/22 0729)  Resp: 18 (05/25/22 0729)  BP: (!) 181/97 (05/25/22 0729)  SpO2: 97 % (05/25/22 0729)   Vital Signs (24h Range):  Temp:  [97.6 °F (36.4 °C)-98.2 °F (36.8 °C)] 97.6 °F (36.4 °C)  Pulse:  [57-77] 61  Resp:  [14-21] 18  SpO2:  [96 %-99 %] 97 %  BP: (163-188)/(85-97) 181/97     Weight: 95.3 kg (210 lb)  Body mass index is 31.93 kg/m².  No intake or output data in the 24 hours ending 05/25/22 0941   Physical Exam  Vitals and nursing note reviewed.   Constitutional:       General: He is not in acute distress.     Appearance: Normal appearance. He is normal weight. He is not ill-appearing, toxic-appearing or diaphoretic.   HENT:      Head: Normocephalic and atraumatic.   Eyes:      Conjunctiva/sclera: Conjunctivae normal.   Cardiovascular:      Rate and Rhythm: Normal rate and regular rhythm.   Pulmonary:      Effort: Pulmonary effort is normal. No respiratory distress.   Musculoskeletal:         General: No swelling.   Skin:     General: Skin is warm and dry.      Coloration: Skin is not jaundiced.      Findings: No bruising.   Neurological:       Mental Status: He is alert.   Psychiatric:         Mood and Affect: Mood normal.         Behavior: Behavior normal.         Thought Content: Thought content normal.       Significant Labs: All pertinent labs within the past 24 hours have been reviewed.  BMP:   Recent Labs   Lab 05/25/22  0511   *      K 4.2      CO2 27   BUN 14   CREATININE 1.0   CALCIUM 9.3   MG 2.2     CBC:   Recent Labs   Lab 05/25/22  0511   WBC 5.54   HGB 13.9*   HCT 46.1          Significant Imaging:

## 2022-05-25 NOTE — PT/OT/SLP PROGRESS
Occupational Therapy      Patient Name:  Huang Knight   MRN:  [id number]    Patient not seen today secondary to Testing/imaging (MRI). Will follow-up later as able.    5/25/2022

## 2022-05-25 NOTE — CONSULTS
[facility] - [place] - Telemetry  Neurology  Consult Note    Patient Name: Huang Knight  MRN: [id number]  Admission Date: 5/24/2022  Hospital Length of Stay: 1 days  Code Status: Full Code   Attending Provider: Chandler Walton MD   Consulting Provider: Sebastien Young MD  Primary Care Physician: Primary Doctor No  Principal Problem:Cerebrovascular accident    Consults  Subjective:     Chief Complaint: Dizziness    HPI: 67 year-old male presented to ED due to dizziness and headache. Patient reports that he experiences the dizziness (described as lightheadedness and weakness) when going from a lying to a sitting position.and upon standing up. After a few seconds of walking it resolves. Mr. Knight also had a frontal headache but symptoms have dissipated. No loss of vision, speech disturbances, vertigo, weakens or numbness of limbs.       Past Medical History:   Diagnosis Date    Hypertension        History reviewed. No pertinent surgical history.    Review of patient's allergies indicates:   Allergen Reactions    Penicillins        Current Neurological Medications:     No current facility-administered medications on file prior to encounter.     Current Outpatient Medications on File Prior to Encounter   Medication Sig    amLODIPine (NORVASC) 10 MG tablet amlodipine 10 mg tablet   Take 1 tablet every day by oral route.    cloNIDine (CATAPRES) 0.1 MG tablet clonidine HCl 0.1 mg tablet    hydrALAZINE (APRESOLINE) 10 MG tablet hydralazine 10 mg tablet    lisinopriL (PRINIVIL,ZESTRIL) 40 MG tablet lisinopril 40 mg tablet   Take 1 tablet every day by oral route.    pravastatin (PRAVACHOL) 40 MG tablet pravastatin 40 mg tablet    spironolactone (ALDACTONE) 25 MG tablet spironolactone 25 mg tablet      Family History    None       Tobacco Use    Smoking status: Never Smoker    Smokeless tobacco: Never Used   Substance and Sexual Activity    Alcohol use: Yes     Comment: RARELY    Drug use: Never    Sexual activity: Not on  file     Review of Systems   Constitutional: Negative for fever.   HENT: Negative for trouble swallowing.    Eyes: Negative for photophobia.   Respiratory: Negative for shortness of breath.    Cardiovascular: Negative for chest pain.   Gastrointestinal: Negative for abdominal pain.   Genitourinary: Negative for dysuria.   Musculoskeletal: Negative for back pain.   Neurological: Negative for headaches.   Psychiatric/Behavioral: Negative for confusion.          Objective:     Vital Signs (Most Recent):  Temp: 98.5 °F (36.9 °C) (05/25/22 1225)  Pulse: 66 (05/25/22 1225)  Resp: 18 (05/25/22 1225)  BP: (!) 160/84 (05/25/22 1225)  SpO2: 96 % (05/25/22 1225) Vital Signs (24h Range):  Temp:  [97.6 °F (36.4 °C)-98.5 °F (36.9 °C)] 98.5 °F (36.9 °C)  Pulse:  [57-77] 66  Resp:  [14-21] 18  SpO2:  [96 %-99 %] 96 %  BP: (160-188)/(84-97) 160/84     Weight: 95.3 kg (210 lb)  Body mass index is 31.93 kg/m².    Physical Exam  Constitutional:       General: He is not in acute distress.  HENT:      Right Ear: External ear normal.      Left Ear: External ear normal.   Eyes:      General:         Right eye: No discharge.         Left eye: No discharge.   Cardiovascular:      Rate and Rhythm: Normal rate.   Pulmonary:      Breath sounds: Normal breath sounds.   Abdominal:      Palpations: Abdomen is soft.   Musculoskeletal:         General: No tenderness.      Cervical back: Neck supple.   Skin:     Findings: No rash.   Neurological:      Mental Status: He is oriented to person, place, and time.      Deep Tendon Reflexes: Strength normal.   Psychiatric:         Speech: Speech normal.         NEUROLOGICAL EXAMINATION:     MENTAL STATUS   Oriented to person, place, and time.   Speech: speech is normal   Level of consciousness: alert    CRANIAL NERVES     CN III, IV, VI   Right pupil: Size: 2 mm. Shape: regular.   Left pupil: Size: 2 mm. Shape: regular.   Conjugate gaze: present    CN V   Right facial sensation deficit: none  Left facial  sensation deficit: none    CN VII   Right facial weakness: none  Left facial weakness: none    CN XII   Tongue deviation: none    MOTOR EXAM     Strength   Strength 5/5 throughout.     SENSORY EXAM   Right arm light touch: normal  Left arm light touch: normal  Right leg light touch: normal  Left leg light touch: normal      Significant Labs:   CBC:   Recent Labs   Lab 05/25/22  0511   WBC 5.54   HGB 13.9*   HCT 46.1        CMP:   Recent Labs   Lab 05/25/22  0511   *      K 4.2      CO2 27   BUN 14   CREATININE 1.0   CALCIUM 9.3   MG 2.2   PROT 7.5   ALBUMIN 3.8   BILITOT 0.4   ALKPHOS 85   AST 21   ALT 51*   ANIONGAP 5*   EGFRNONAA >60       Significant Imaging: I have reviewed all pertinent imaging results/findings within the past 24 hours.     Head CT  Impression:     No acute intracranial process.  Areas of chronic ischemic injury and hemorrhage are noted as detailed above.        Electronically signed by: Isael Blakely  Date:                                            05/25/2022  Time:                                           10:42    Assessment and Plan:     66 y/o male consulted for dizziness    1. Dizziness: symptoms seem to be positional. Orthostatic hypotension?   No motor or sensory deficits, or coordination difficulties.   -MRI with no signs of acute stroke   -No further recs. Ok to D/C home.    Active Diagnoses:    Diagnosis Date Noted POA    PRINCIPAL PROBLEM:  Cerebrovascular accident [I63.9] 01/13/2018 Yes    Morbid obesity [E66.01] 05/24/2022 Yes    TIA (transient ischemic attack) [G45.9] 05/24/2022 Yes    Hyperlipidemia [E78.5] 01/01/2021 Yes    Essential hypertension [I10] 01/01/2021 Yes    Coronary atherosclerosis [I25.10] 03/02/2015 Yes      Problems Resolved During this Admission:    Diagnosis Date Noted Date Resolved POA    Obesity (BMI 30-39.9) [E66.9] 05/24/2022 05/24/2022 Unknown       VTE Risk Mitigation (From admission, onward)         Ordered      enoxaparin injection 40 mg  Daily         05/24/22 2320     IP VTE HIGH RISK PATIENT  Once         05/24/22 2320     Place sequential compression device  Until discontinued         05/24/22 2320                Thank you for your consult. I will follow-up with patient. Please contact us if you have any additional questions.    Sebastien Young MD  Neurology  [facility] [place] [facility]

## 2022-05-25 NOTE — SUBJECTIVE & OBJECTIVE
Past Medical History:   Diagnosis Date    Hypertension        History reviewed. No pertinent surgical history.    Review of patient's allergies indicates:   Allergen Reactions    Penicillins        No current facility-administered medications on file prior to encounter.     Current Outpatient Medications on File Prior to Encounter   Medication Sig    amLODIPine (NORVASC) 10 MG tablet amlodipine 10 mg tablet   Take 1 tablet every day by oral route.    cloNIDine (CATAPRES) 0.1 MG tablet clonidine HCl 0.1 mg tablet    hydrALAZINE (APRESOLINE) 10 MG tablet hydralazine 10 mg tablet    lisinopriL (PRINIVIL,ZESTRIL) 40 MG tablet lisinopril 40 mg tablet   Take 1 tablet every day by oral route.    pravastatin (PRAVACHOL) 40 MG tablet pravastatin 40 mg tablet    spironolactone (ALDACTONE) 25 MG tablet spironolactone 25 mg tablet    [DISCONTINUED] albuterol (PROVENTIL/VENTOLIN HFA) 90 mcg/actuation inhaler Inhale 2 puffs into the lungs every 6 (six) hours as needed for Wheezing or Shortness of Breath.    [DISCONTINUED] ibuprofen (ADVIL,MOTRIN) 800 MG tablet Take 1 tablet (800 mg total) by mouth every 8 (eight) hours as needed for Pain.    [DISCONTINUED] pulse oximeter (PULSE OXIMETER) device by Apply Externally route 2 (two) times a day. Use twice daily at 8 AM and 3 PM and record the value in MyChart as directed.     Family History    None       Tobacco Use    Smoking status: Never Smoker    Smokeless tobacco: Never Used   Substance and Sexual Activity    Alcohol use: Yes     Comment: RARELY    Drug use: Never    Sexual activity: Not on file     Review of Systems  Constitutional: Negative.  Negative for appetite change, chills and fever.   HENT: Negative.  Negative for congestion.    Eyes: Negative.  Negative for visual disturbance.   Respiratory: Negative.  Negative for cough and shortness of breath.    Cardiovascular: Negative.  Negative for chest pain.   Gastrointestinal: Negative.  Negative for diarrhea, nausea and  vomiting.   Endocrine: Negative.    Genitourinary: Negative.    Musculoskeletal: Negative.    Skin: Negative.    Allergic/Immunologic: Negative.    Neurological: Positive for dizziness and headaches.   Hematological: Negative.    Psychiatric/Behavioral: Negative.       Objective:     Vital Signs (Most Recent):  Temp: 98.2 °F (36.8 °C) (05/24/22 2222)  Pulse: 67 (05/24/22 2222)  Resp: 20 (05/24/22 2222)  BP: (!) 180/91 (05/24/22 2222)  SpO2: 98 % (05/24/22 2202)   Vital Signs (24h Range):  Temp:  [98 °F (36.7 °C)-98.2 °F (36.8 °C)] 98.2 °F (36.8 °C)  Pulse:  [58-77] 67  Resp:  [14-21] 20  SpO2:  [96 %-99 %] 98 %  BP: (163-188)/(88-93) 180/91     Weight: 95.3 kg (210 lb)  Body mass index is 31.93 kg/m².    Physical Exam   Nursing note and vitals reviewed.  Constitutional: He appears well-developed and well-nourished. He is not diaphoretic. No distress.   HENT:   Head: Normocephalic and atraumatic.   Nose: Nose normal.   Mouth/Throat: No oropharyngeal exudate.   Eyes: EOM are normal. Pupils are equal, round, and reactive to light.   Neck: Neck supple. No tracheal deviation present. No JVD present.   Normal range of motion.  Cardiovascular: Normal rate, regular rhythm and normal heart sounds.   No murmur heard.  Pulmonary/Chest: Breath sounds normal. No respiratory distress. He has no wheezes. He has no rhonchi. He has no rales.   Abdominal: Abdomen is soft. Bowel sounds are normal. He exhibits no distension. There is no abdominal tenderness. There is no rebound and no guarding.   Musculoskeletal:         General: No tenderness or edema. Normal range of motion.      Cervical back: Normal range of motion and neck supple.     Neurological: He is alert and oriented to person, place, and time. He has normal strength. No cranial nerve deficit.   Skin: Skin is warm and dry. Capillary refill takes less than 2 seconds. No rash noted. No erythema.      Significant Labs: All pertinent labs within the past 24 hours have been  reviewed.    Significant Imaging: I have reviewed all pertinent imaging results/findings within the past 24 hours.

## 2022-05-25 NOTE — PT/OT/SLP EVAL
Physical Therapy Evaluation and Discharge Note    Patient Name:  Huang Knight   MRN:  [id number]    Recommendations:     Discharge Recommendations:  home   Discharge Equipment Recommendations: none   Barriers to discharge: None    Assessment:     Huang Knight is a 67 y.o. male admitted with a medical diagnosis of Cerebrovascular accident. .  At this time, patient is functioning at their prior level of function and does not require further acute PT services.       Plan:     During this hospitalization, patient does not require further acute PT services.  Please re-consult if situation changes.      Subjective     Chief Complaint: none  Patient/Family Comments/goals: not stated  Pain/Comfort:  · Pain Rating 1: 0/10    Patients cultural, spiritual, [employer] conflicts given the current situation: no    Living Environment:  2nd floor/story apt with wife. Denies h/o falls.   Prior to admission, patients level of function was ambulatory with SPC vs without when walking dog.  Equipment used at home: cane, straight, walker, rolling.  DME owned (not currently used): none.  Upon discharge, patient will have assistance from n/a.    Objective:     Communicated with SRINI Hill prior to session.  Patient found sitting edge of bed with peripheral IV upon PT entry to room.    General Precautions: Standard, fall   Orthopedic Precautions:N/A   Braces: N/A   Respiratory Status: Room air    Exams:  · Cognitive Exam:  Patient is alert and appropriate. Pleasant.    · Gross Motor Coordination:  WFL  · Sensation:    · -       Intact  · RLE ROM: WFL  · RLE Strength: WFL  · LLE ROM: WFL  · LLE Strength: WFL  · Wife stated patients appears to be ambulating at baseline    Functional Mobility:  · Transfers:     · Sit to Stand:  modified independence with straight cane  · Gait: with SPC ~ 100 ft.   · Denied dizziness  · No LOB  · Cues to increase ground contact with cane  · Balance: good (-)/fair + amb with SPC      AM-PAC 6 CLICK  MOBILITY  Total Score:24     Patient left sitting edge of bed with call button in reach, wife present and recliner sanitized for patient and placed in room.    GOALS:   Multidisciplinary Problems     Physical Therapy Goals     Not on file                History:     Past Medical History:   Diagnosis Date    Hypertension        History reviewed. No pertinent surgical history.    Time Tracking:     PT Received On: 05/25/22  PT Start Time: 1405     PT Stop Time: 1413  PT Total Time (min): 8 min     Billable Minutes: Evaluation 8      05/25/2022

## 2022-05-25 NOTE — PROGRESS NOTES
[facility] Medicine  Progress Note    Patient Name: Huang Knight  MRN: [id number]  Patient Class: IP- Inpatient   Admission Date: 5/24/2022  Length of Stay: 1 days  Attending Physician: Chandler Walton MD  Primary Care Provider: Primary Doctor No        Subjective:     Principal Problem:Cerebrovascular accident        HPI:  67 y.o. male  who presents to the ED for chief complaint of dizziness and headache onset 4 AM today. Patient reports that he experiences the dizziness when going from a lying to a sitting position. He states that if he stands up and ambulates, the dizziness resolves. Patient reports that his headache is better now compared to this morning. He attempted treatment with Aspirin and Tylenol with some relief of the headache. Patient endorses EtOH use occasionally but denies tobacco use. PMHx of HTN      Overview/Hospital Course:  Patient admitted to the hospital for possible CVA.        Interval History: No new issues     Review of Systems   Constitutional:  Negative for activity change, appetite change, chills, diaphoresis and fatigue.   HENT:  Negative for congestion and dental problem.    Eyes:  Negative for discharge.   Respiratory:  Negative for apnea and chest tightness.    Cardiovascular:  Negative for chest pain.   Gastrointestinal:  Negative for abdominal distention, abdominal pain, nausea and vomiting.   Endocrine: Negative for cold intolerance.   Genitourinary:  Negative for difficulty urinating and dysuria.   Musculoskeletal:  Negative for arthralgias and back pain.   Skin:  Negative for color change.   Neurological:  Negative for facial asymmetry and headaches.   Psychiatric/Behavioral:  Negative for agitation and behavioral problems.    Objective:     Vital Signs (Most Recent):  Temp: 97.6 °F (36.4 °C) (05/25/22 0729)  Pulse: 61 (05/25/22 0729)  Resp: 18 (05/25/22 0729)  BP: (!) 181/97 (05/25/22 0729)  SpO2: 97 % (05/25/22 0729)   Vital Signs (24h Range):  Temp:   [97.6 °F (36.4 °C)-98.2 °F (36.8 °C)] 97.6 °F (36.4 °C)  Pulse:  [57-77] 61  Resp:  [14-21] 18  SpO2:  [96 %-99 %] 97 %  BP: (163-188)/(85-97) 181/97     Weight: 95.3 kg (210 lb)  Body mass index is 31.93 kg/m².  No intake or output data in the 24 hours ending 05/25/22 0941   Physical Exam  Vitals and nursing note reviewed.   Constitutional:       General: He is not in acute distress.     Appearance: Normal appearance. He is normal weight. He is not ill-appearing, toxic-appearing or diaphoretic.   HENT:      Head: Normocephalic and atraumatic.   Eyes:      Conjunctiva/sclera: Conjunctivae normal.   Cardiovascular:      Rate and Rhythm: Normal rate and regular rhythm.   Pulmonary:      Effort: Pulmonary effort is normal. No respiratory distress.   Musculoskeletal:         General: No swelling.   Skin:     General: Skin is warm and dry.      Coloration: Skin is not jaundiced.      Findings: No bruising.   Neurological:      Mental Status: He is alert.   Psychiatric:         Mood and Affect: Mood normal.         Behavior: Behavior normal.         Thought Content: Thought content normal.       Significant Labs: All pertinent labs within the past 24 hours have been reviewed.  BMP:   Recent Labs   Lab 05/25/22  0511   *      K 4.2      CO2 27   BUN 14   CREATININE 1.0   CALCIUM 9.3   MG 2.2     CBC:   Recent Labs   Lab 05/25/22  0511   WBC 5.54   HGB 13.9*   HCT 46.1          Significant Imaging:       Assessment/Plan:      * Cerebrovascular accident  Consult neuro  Echo and MRI brain in am  PT/OT/ST eval in am  ASA, plavix, statin   Neuro checks  continuous tele monitoring  EKG as needed   CT head showing age-indeterminate left basal gangliar infarct    MRI today       TIA (transient ischemic attack)    History of TIA last year     Morbid obesity  Body mass index is 31.93 kg/m². Morbid obesity complicates all aspects of disease management from diagnostic modalities to treatment. Weight loss  encouraged and health benefits explained to patient.         Coronary atherosclerosis  Resume home meds       Essential hypertension- permissive for now        Hyperlipidemia  Resume home statin         VTE Risk Mitigation (From admission, onward)         Ordered     enoxaparin injection 40 mg  Daily         05/24/22 2320     IP VTE HIGH RISK PATIENT  Once         05/24/22 2320     Place sequential compression device  Until discontinued         05/24/22 2320                Discharge Planning   SHERRY:      Code Status: Full Code   Is the patient medically ready for discharge?:     Reason for patient still in hospital (select all that apply): Patient unstable         Neuro consult. MRI. Home later today vs. Tomorrow             Chandler Mendoza MD  Department of Hospital Medicine   [facility] [place] - Telemetry

## 2022-05-25 NOTE — PLAN OF CARE
Problem: Occupational Therapy  Goal: Occupational Therapy Goal  Outcome: Adequate for Care Transition     Initial OT eval completed. Pt is MOD I with ADLs and functional mobility with use of str c. No dizziness reported. OT rec home with family. OT to sign off. Thank you.

## 2022-05-25 NOTE — HPI
67 y.o. male  who presents to the ED for chief complaint of dizziness and headache onset 4 AM today. Patient reports that he experiences the dizziness when going from a lying to a sitting position. He states that if he stands up and ambulates, the dizziness resolves. Patient reports that his headache is better now compared to this morning. He attempted treatment with Aspirin and Tylenol with some relief of the headache. Patient endorses EtOH use occasionally but denies tobacco use. PMHx of HTN

## 2022-05-25 NOTE — ASSESSMENT & PLAN NOTE
Consult neuro  Echo and MRI brain in am  PT/OT/ST eval in am  ASA, plavix, statin   Neuro checks  continuous tele monitoring  EKG as needed   CT head showing age-indeterminate left basal gangliar infarct

## 2022-05-25 NOTE — PLAN OF CARE
Problem: Adult Inpatient Plan of Care  Goal: Plan of Care Review  5/25/2022 1808 by Elza Carpio RN  Outcome: Met  5/25/2022 1807 by Elza Carpio RN  Outcome: Ongoing, Progressing  Goal: Patient-Specific Goal (Individualized)  5/25/2022 1808 by Elza Carpio RN  Outcome: Met  5/25/2022 1807 by Elza Carpio RN  Outcome: Ongoing, Progressing  Goal: Absence of Hospital-Acquired Illness or Injury  5/25/2022 1808 by Elza Carpio RN  Outcome: Met  5/25/2022 1807 by Elza Carpio RN  Outcome: Ongoing, Progressing  Goal: Optimal Comfort and Wellbeing  5/25/2022 1808 by Elza Carpio RN  Outcome: Met  5/25/2022 1807 by Elza Carpio RN  Outcome: Ongoing, Progressing  Goal: Readiness for Transition of Care  5/25/2022 1808 by Elza Carpio RN  Outcome: Met  5/25/2022 1807 by Elza Carpio RN  Outcome: Ongoing, Progressing     Problem: Infection  Goal: Absence of Infection Signs and Symptoms  Outcome: Met     Problem: Adjustment to Illness (Stroke, Ischemic/Transient Ischemic Attack)  Goal: Optimal Coping  Outcome: Met     Problem: Bowel Elimination Impaired (Stroke, Ischemic/Transient Ischemic Attack)  Goal: Effective Bowel Elimination  Outcome: Met     Problem: Cerebral Tissue Perfusion (Stroke, Ischemic/Transient Ischemic Attack)  Goal: Optimal Cerebral Tissue Perfusion  Outcome: Met     Problem: Cognitive Impairment (Stroke, Ischemic/Transient Ischemic Attack)  Goal: Optimal Cognitive Function  Outcome: Met     Problem: Communication Impairment (Stroke, Ischemic/Transient Ischemic Attack)  Goal: Improved Communication Skills  Outcome: Met     Problem: Functional Ability Impaired (Stroke, Ischemic/Transient Ischemic Attack)  Goal: Optimal Functional Ability  Outcome: Met     Problem: Respiratory Compromise (Stroke, Ischemic/Transient Ischemic Attack)  Goal: Effective Oxygenation and Ventilation  Outcome: Met     Problem: Sensorimotor Impairment (Stroke, Ischemic/Transient  Ischemic Attack)  Goal: Improved Sensorimotor Function  Outcome: Met     Problem: Swallowing Impairment (Stroke, Ischemic/Transient Ischemic Attack)  Goal: Optimal Eating and Swallowing without Aspiration  Outcome: Met     Problem: Urinary Elimination Impaired (Stroke, Ischemic/Transient Ischemic Attack)  Goal: Effective Urinary Elimination  Outcome: Met     Problem: Occupational Therapy  Goal: Occupational Therapy Goal  Outcome: Met

## 2022-05-25 NOTE — PT/OT/SLP EVAL
Occupational Therapy   Evaluation and Discharge Note    Name: Huang Knight  MRN: [id number]  Admitting Diagnosis:  Cerebrovascular accident   Recent Surgery: * No surgery found *      Recommendations:     Discharge Recommendations: home (with family support)  Discharge Equipment Recommendations:  none  Barriers to discharge:  None    Assessment:     Huang Knight is a 67 y.o. male with a medical diagnosis of Cerebrovascular accident. Pt is MOD I with ADLs and functional mobility with use of str c. No dizziness reported. OT rec home with family.     Plan:     During this hospitalization, patient does not require further acute OT services.  Please re-consult if situation changes.    · Plan of Care Reviewed with: patient, spouse    Subjective     Chief Complaint: none reported   Patient/Family Comments/goals: feels back to baseline; has been walking to the bathroom with no difficulties     Occupational Profile:  Living Environment: pt lives with his wife in a 2 story apartment with B/L HR to the second floor. Bathroom set-up: tub/shower combo.  Previous level of function: MOD I with ADLs and mobility, using str c prn. No falls reported in the last 3 months.   Roles and Routines: plays with his dog (jose giles)   Equipment Used at home:  walker, rolling, cane, straight  Assistance upon Discharge: wife     Pain/Comfort:  · Pain Rating 1: 0/10    Patients cultural, spiritual, Temple conflicts given the current situation: no    Objective:     Patient found HOB elevated with peripheral IV upon OT entry to room.    General Precautions: Standard     Orthopedic Precautions:N/A   Braces: N/A  Respiratory Status: Room air     Occupational Performance:    Bed Mobility:    · Patient completed Scooting with modified independence  · Patient completed Supine to Sit with modified independence    Functional Mobility/Transfers:  · Patient completed Sit <> Stand Transfer with modified independence  with  straight cane   · Functional  Mobility: pt completed in-room functional mobility with MOD I and further mobility in the hallway with PT.     Activities of Daily Living:  · Upper Body Dressing: modified independence donning face mask while standing  · Lower Body Dressing: modified independence donning B/L shoes seated EOB    Cognitive/Visual Perceptual:  Cognitive/Psychosocial Skills:     -       Follows Commands/attention:Follows multistep  commands  -       Communication: clear/fluent  -       Memory: No Deficits noted  -       Safety awareness/insight to disability: intact   -       Mood/Affect/Coping skills/emotional control: Cooperative and Pleasant  Visual/Perceptual:      -Intact  R/L discrimination      Physical Exam:  Balance:    -       seated: MOD I; standing: MOD I  Postural examination/scapula alignment:    -       Rounded shoulders  Skin integrity: Visible skin intact  Edema:  no BUE edema noted  Sensation:    -       Intact  light/touch BUE  Dominant hand:    -       Right  Upper Extremity Range of Motion:     -       Right Upper Extremity: WFL  -       Left Upper Extremity: WFL  Upper Extremity Strength:    -       Right Upper Extremity: WFL  -       Left Upper Extremity: WFL   Strength:    -       Right Upper Extremity: WFL  -       Left Upper Extremity: WFL  Fine Motor Coordination:    -       Intact  Left hand, manipulation of objects and Right hand, manipulation of objects  Gross motor coordination:   WFL    AMPAC 6 Click ADL:  AMPAC Total Score: 24    Treatment & Education:  · Pt educated on OT role/end of POC.    · Multiple self-care tasks/functional mobility completed- assistance level noted above   · All questions/concerns answered within OT scope of practice     Education:    Patient left ambulatory in room/ledbetter with PTLilly, with all lines intact and wife present    GOALS:   Multidisciplinary Problems     Occupational Therapy Goals        Problem: Occupational Therapy    Goal Priority Disciplines Outcome  Interventions   Occupational Therapy Goal     OT, PT/OT Adequate for Care Transition                    History:     Past Medical History:   Diagnosis Date    Hypertension        History reviewed. No pertinent surgical history.    Time Tracking:     OT Date of Treatment: 05/25/22  OT Start Time: 1401  OT Stop Time: 1409  OT Total Time (min): 8 min    Billable Minutes:Evaluation 8 min (partial co-eval with PT)    5/25/2022

## 2022-05-25 NOTE — TELEPHONE ENCOUNTER
----- Message from Kenia Fernando sent at 5/25/2022  2:36 PM CDT -----  Type: Patient Call Back    Who called:pt    What is the request in detail:pt calling to get hospital f/u to see dr lyons. Nothing available Call pt    Can the clinic reply by [facility]?    Would the patient rather a call back or a response via [facility]? call    Best call back number:[phone] (home)       Additional Information:

## 2022-05-25 NOTE — PLAN OF CARE
2300: pt arrived to unit by stretcher. Pt alert and oriented X4. In no apparent distress. Cardiac monitor applied and vitals taken. Spouse at bed side. No complaints of headaches or dizziness. Call button placed within reach. Fall precautions in place. Spouse at bedside.    0715: Report given to day shift RN. No acute events over night.   Problem: Adult Inpatient Plan of Care  Goal: Plan of Care Review  Outcome: Ongoing, Progressing     Problem: Adult Inpatient Plan of Care  Goal: Absence of Hospital-Acquired Illness or Injury  Outcome: Ongoing, Progressing     Problem: Cerebral Tissue Perfusion (Stroke, Ischemic/Transient Ischemic Attack)  Goal: Optimal Cerebral Tissue Perfusion  Outcome: Ongoing, Progressing

## 2022-05-25 NOTE — ASSESSMENT & PLAN NOTE
Consult neuro  Echo and MRI brain in am  PT/OT/ST eval in am  ASA, plavix, statin   Neuro checks  continuous tele monitoring  EKG as needed   CT head showing age-indeterminate left basal gangliar infarct    MRI today

## 2022-05-25 NOTE — PLAN OF CARE
[facility] - Telemetry  Discharge Final Note    Primary Care Provider: Alfred Jefferson MD    Expected Discharge Date: 5/25/2022    Final Discharge Note (most recent)       Final Note - 05/25/22 1447          Final Note    Assessment Type Final Discharge Note     Anticipated Discharge Disposition Home or Self Care     What phone number can be called within the next 1-3 days to see how you are doing after discharge? [phone]     Hospital Resources/Appts/Education Provided Provided patient/caregiver with written discharge plan information;Appointments scheduled and added to AVS;Appointments scheduled by Navigator/Coordinator        Post-Acute Status    Post-Acute Authorization Other     Coverage PHN     Other Status No Post-Acute Service Needs     Discharge Delays None known at this time                     Important Message from Medicare             Contact Info       Jett Boothe MD   Specialty: Neurology    [address] [address]  [address]  [facility] [address]   Phone: [phone]       Next Steps: Follow up    Instructions: They will call you to schedule first appointment.          SW secure chat patient's nurse April. Patient is cleared from case management standpoint.

## 2022-05-25 NOTE — NURSING
Patient awake, alert, and oriented x 4, no complaints or concerns voiced from pt or wife at this time. No discomfort or distress noted in pt. Pt discharged home, accompanied by wife. Pt discharged with all his belongings. Pt transported by staff to car by wheelchair.

## 2022-05-25 NOTE — ASSESSMENT & PLAN NOTE
Body mass index is 31.93 kg/m². Morbid obesity complicates all aspects of disease management from diagnostic modalities to treatment. Weight loss encouraged and health benefits explained to patient.

## 2022-05-25 NOTE — HOSPITAL COURSE
Patient admitted to the hospital for possible CVA.  Patient evaluated by neuro. MRI was negative and the patient will be discharged to home. Follow up with Dr. Boothe in one week. Activity as tolerated. Diet- low NA. Activity as tolerated. Patient's symptoms resolved.  ASA was stopped due to some micro- hemorrhages.

## 2022-06-06 ENCOUNTER — HOSPITAL ENCOUNTER (EMERGENCY)
Facility: HOSPITAL | Age: 67
Discharge: HOME | End: 2022-06-06
Attending: EMERGENCY MEDICINE
Payer: MEDICARE

## 2022-06-06 VITALS
SYSTOLIC BLOOD PRESSURE: 153 MMHG | RESPIRATION RATE: 16 BRPM | OXYGEN SATURATION: 97 % | HEART RATE: 90 BPM | DIASTOLIC BLOOD PRESSURE: 97 MMHG | BODY MASS INDEX: 31.83 KG/M2 | HEIGHT: 68 IN | WEIGHT: 210 LBS | TEMPERATURE: 98 F

## 2022-06-06 DIAGNOSIS — R51.9 ACUTE NONINTRACTABLE HEADACHE, UNSPECIFIED HEADACHE TYPE: Primary | ICD-10-CM

## 2022-06-06 PROCEDURE — 99283 EMERGENCY DEPT VISIT LOW MDM: CPT | Mod: ER

## 2022-06-06 RX ORDER — BUTALBITAL, ACETAMINOPHEN AND CAFFEINE 50; 325; 40 MG/1; MG/1; MG/1
1 TABLET ORAL EVERY 4 HOURS PRN
Qty: 12 TABLET | Refills: 0 | Status: SHIPPED | OUTPATIENT
Start: 2022-06-06 | End: 2022-07-06

## 2022-06-06 NOTE — ED PROVIDER NOTES
Encounter Date: 6/6/2022       History     Chief Complaint   Patient presents with    Headache     Complains of headache starting tonight. States had similar headache and was told not to take aspirin from prior hospital. Requesting something besides aspirin for headache at this time. Denies head trauma/injury. Denies any nausea or visual changes. States headache started after watching basketball game tonight.     67 y.o. male presents to ED c/o acute , frontal, throbbing headache that began this evening while he was watching a basketball game.  He reports similar headache three weeks ago which was associated with dizziness at that time.  He reports being hospitalized for stroke workup which was negative.  Upon discharge, he reports being told not to take aspirin anymore.  Tonight, he states he did not know what to take since he cannot take aspirin for the recurrent headache. He states HA was 8/10 in severity prior to arrival and is now resolved.   He denies nausea, vomiting, dizziness, vision disturbance, fever, appetite change, cough, congestion, focal weakness, paresthesias, or syncope.  Drinks beer twice weekly. Denies caffeine intake. Drinks about 1/2 gal water daily.  He reports taking prescribed antihypertensive as directed and states his blood pressure has been variable.        Review of patient's allergies indicates:   Allergen Reactions    Penicillins Other (See Comments)     Past Medical History:   Diagnosis Date    Hypertension      No past surgical history on file.  No family history on file.  Social History     Tobacco Use    Smoking status: Never Smoker    Smokeless tobacco: Never Used   Substance Use Topics    Alcohol use: Yes     Comment: RARELY    Drug use: Never     Review of Systems   Constitutional: Negative for activity change, appetite change, fatigue and fever.   HENT: Negative for congestion and rhinorrhea.    Eyes: Negative for photophobia and visual disturbance.   Respiratory:  Negative for cough and shortness of breath.    Cardiovascular: Negative for chest pain.   Gastrointestinal: Negative for nausea.   Musculoskeletal: Negative for gait problem.   Neurological: Positive for headaches. Negative for dizziness.   All other systems reviewed and are negative.      Physical Exam     Initial Vitals [06/06/22 0019]   BP Pulse Resp Temp SpO2   (!) 138/95 (!) 113 16 98.6 °F (37 °C) 96 %      MAP       --         Physical Exam    Nursing note and vitals reviewed.  Constitutional: He appears well-developed and well-nourished. He is not diaphoretic. No distress.   HENT:   Head: Normocephalic and atraumatic.   Mouth/Throat: Uvula is midline and oropharynx is clear and moist. Mucous membranes are dry. No uvula swelling.   Eyes: Conjunctivae are normal.   Neck: Phonation normal. No stridor present.   Normal range of motion.  Cardiovascular: Regular rhythm and intact distal pulses.   Pulmonary/Chest: Effort normal. No accessory muscle usage or stridor. No tachypnea. No respiratory distress.   Abdominal: He exhibits no distension. There is no abdominal tenderness.   Musculoskeletal:         General: No tenderness. Normal range of motion.      Cervical back: Normal range of motion.     Neurological: He is alert and oriented to person, place, and time. He has normal strength. He is not disoriented. No cranial nerve deficit or sensory deficit. Coordination and gait normal. GCS score is 15. GCS eye subscore is 4. GCS verbal subscore is 5. GCS motor subscore is 6.   Skin: Skin is warm and intact.   Psychiatric: He has a normal mood and affect.         ED Course   Procedures  Labs Reviewed - No data to display       Imaging Results    None          Medications - No data to display              ED Course as of 06/08/22 0410   Mon Jun 06, 2022   0234 Headache now resolved spontaneously [DL]      ED Course User Index  [DL] Prince Capellan MD           Vitals:    06/06/22 0019 06/06/22 0251   BP: (!) 138/95 (!)  "153/97   BP Location: Right arm Left arm   Patient Position: Sitting Sitting   Pulse: (!) 113 90   Resp: 16    Temp: 98.6 °F (37 °C) 98.3 °F (36.8 °C)   TempSrc: Oral Oral   SpO2: 96% 97%   Weight: 95.3 kg (210 lb)    Height: 5' 8" (1.727 m)        Clinical Impression:   Final diagnoses:  [R51.9] Acute nonintractable headache, unspecified headache type (Primary)          ED Disposition Condition    Discharge Stable        ED Prescriptions     Medication Sig Dispense Start Date End Date Auth. Provider    butalbital-acetaminophen-caffeine -40 mg (FIORICET, ESGIC) -40 mg per tablet Take 1 tablet by mouth every 4 (four) hours as needed for Headaches. 12 tablet 6/6/2022 7/6/2022 Prince Capellan MD        Follow-up Information     Follow up With Specialties Details Why Contact Info    Alfred Jefferson MD Internal Medicine Call today to schedule an appointment, for re-evaluation of today's complaint, and ongoing care [address] [facility] [address]      The nearest emergency department.  Go to  As needed, If symptoms worsen     Jett Boothe MD Neurology Call today to schedule an appointment, for re-evaluation of today's complaint [address]  [address]  [facility] [address] [phone]             Prince Capellan MD  06/08/22 0410    "

## 2023-11-30 NOTE — PLAN OF CARE
[facility] - Telemetry  Initial Discharge Assessment       Primary Care Provider: Alfred Jefferson MD    Admission Diagnosis: Dizziness [R42]  Cerebrovascular accident (CVA), unspecified mechanism [I63.9]    Admission Date: 5/24/2022  Expected Discharge Date:     Discharge Barriers Identified: None    Payor: [employer]ICARE / Plan: [employer] / Product Type: Medicare Advantage /     Extended Emergency Contact Information  Primary Emergency Contact: ELIESER KNIGHT  Mobile Phone: [phone]  Relation: Relative  Preferred language: English   needed? No  Secondary Emergency Contact: Huang Knight  Mobile Phone: [phone]  Relation: Son  Preferred language: English   needed? No    Discharge Plan A: Home with family  Discharge Plan B: Home with family      [id number] [place] - [address] AT [facility] [address]  [address]  [place] [address]  Phone: [phone] Fax: [phone]      Initial Assessment (most recent)       Adult Discharge Assessment - 05/25/22 1244          Discharge Assessment    Assessment Type Discharge Planning Assessment     Confirmed/corrected address, phone number and insurance Yes     Confirmed Demographics Correct on Facesheet     Source of Information patient     If unable to respond/provide information was family/caregiver contacted? No Contact Information Available     When was your last doctors appointment? --   Patient stated last PCP appt was 6 months ago.    Communicated SHERRY with patient/caregiver Date not available/Unable to determine     Reason For Admission Dizziness     Lives With spouse     Facility Arrived From: Home     Do you expect to return to your current living situation? Yes     Do you have help at home or someone to help you manage your care at home? Yes     Who are your caregiver(s) and their phone number(s)? ELIESER KNIGHT (Spouse)   [phone]     Prior to hospitilization  cognitive status: Alert/Oriented     Current cognitive status: Alert/Oriented     Equipment Currently Used at Home cane, straight     Readmission within 30 days? No     Patient currently being followed by outpatient case management? No     Do you currently have service(s) that help you manage your care at home? No     Do you take prescription medications? Yes     Do you have prescription coverage? Yes     Coverage PHN and Medicaid     Do you have any problems affording any of your prescribed medications? No     Is the patient taking medications as prescribed? yes     Who is going to help you get home at discharge? ELIESER BARRAZA (Spouse)   [phone]     How do you get to doctors appointments? family or friend will provide     Are you on dialysis? No     Do you take coumadin? No     Discharge Plan A Home with family     Discharge Plan B Home with family     DME Needed Upon Discharge  other (see comments)   TBD    Discharge Plan discussed with: Patient;Spouse/sig other     Name(s) and Number(s) ELIESER BARRAZA (Spouse)   [phone]     Discharge Barriers Identified None        Relationship/Environment    Name(s) of Who Lives With Patient ELIESER BARRAZA (Spouse)   [phone]                      Patient's spouse provides transportation to patient's doctor's appointments. Patient stated he prefers morning appointments. Patient's spouse also stated patient has PHN as insurance.            Vaginal Delivery Statement Selected